# Patient Record
Sex: MALE | Race: WHITE | NOT HISPANIC OR LATINO | Employment: FULL TIME | URBAN - METROPOLITAN AREA
[De-identification: names, ages, dates, MRNs, and addresses within clinical notes are randomized per-mention and may not be internally consistent; named-entity substitution may affect disease eponyms.]

---

## 2019-09-24 ENCOUNTER — HISTORICAL (OUTPATIENT)
Dept: ADMINISTRATIVE | Facility: HOSPITAL | Age: 44
End: 2019-09-24

## 2019-09-24 LAB
INFLUENZA A ANTIGEN, POC: NEGATIVE
INFLUENZA B ANTIGEN, POC: NEGATIVE

## 2021-08-23 ENCOUNTER — HISTORICAL (OUTPATIENT)
Dept: RADIOLOGY | Facility: HOSPITAL | Age: 46
End: 2021-08-23

## 2021-09-30 ENCOUNTER — HISTORICAL (OUTPATIENT)
Dept: ADMINISTRATIVE | Facility: HOSPITAL | Age: 46
End: 2021-09-30

## 2021-09-30 LAB — SARS-COV-2 AG RESP QL IA.RAPID: NEGATIVE

## 2021-11-08 ENCOUNTER — HISTORICAL (OUTPATIENT)
Dept: ADMINISTRATIVE | Facility: HOSPITAL | Age: 46
End: 2021-11-08

## 2021-11-08 LAB — SARS-COV-2 RNA RESP QL NAA+PROBE: NEGATIVE

## 2021-12-22 ENCOUNTER — HISTORICAL (OUTPATIENT)
Dept: ADMINISTRATIVE | Facility: HOSPITAL | Age: 46
End: 2021-12-22

## 2022-04-10 ENCOUNTER — HISTORICAL (OUTPATIENT)
Dept: ADMINISTRATIVE | Facility: HOSPITAL | Age: 47
End: 2022-04-10

## 2022-04-27 VITALS
SYSTOLIC BLOOD PRESSURE: 138 MMHG | WEIGHT: 315 LBS | BODY MASS INDEX: 37.19 KG/M2 | DIASTOLIC BLOOD PRESSURE: 84 MMHG | OXYGEN SATURATION: 97 % | HEIGHT: 77 IN

## 2022-05-03 NOTE — HISTORICAL [facility]
This is a historical note converted from Yohannes. Formatting and pictures may have been removed.  Please reference Yohannes for original formatting and attached multimedia. Chief Complaint  aches, stuffy nose, cough 2-3 days, yesterday upper abdominal/back pain on left side, + exposure to flu, took dayquil/ emergen-c  History of Present Illness  45 yo M with hx of DM, CAD presents for about?5 days of aches, chills, fatigue, cough and congestion that worsened yesterday with aches.? R lower rib pain started last night. No fever, wife with similar symptoms last week + for flu. Trying DayQuil and Emergen-C.? Had pneumonia and flu a few years prior.  Review of Systems  Constitutional_negative for fever, + chills  ENMT_+rhinorrhea, + sinus pressure, + sore throat, no blurry vision or change in vision  Respiratory_+ cough  Gastrointestinal_negative for nausea or vomiting  Integumentary_negative for rash  Physical Exam  Vitals & Measurements  T:?36.7? ?C (Oral)? HR:?86(Peripheral)? RR:?19? BP:?168/105? SpO2:?100%?  HT:?195?cm? WT:?152.8?kg? BMI:?40.18?  Gen: WD NAD  CV: S1S2 RRR no MRG  Resp: reduced breath sounds BLL  HEENT: clear rhinorrhea, no cervical JOSÉ ANTONIO, +PND, TMs without bulging or erythema bilaterally, no posterior pharyngeal erythema?  MS: minimal TTP of L anterior and posterior lower ribs  Psych: Cooperative, approp mood and affect  Assessment/Plan  1.?Cough?R05  ?X-ray of the chest done today, per my review negative for pneumonia.  Will also call with final report  Flu test negative  Ordered:  Office/Outpatient Visit Level 4 Established [id number] PC, Cough  Nasopharyngitis, [facility], 09/24/19 8:53:00 CDT  XR Chest 2 Views, Routine, 09/24/19 8:25:00 CDT, Congestion, cough, pressure/sharp LLL area, None, Wheelchair, Patient Has IV?, Rad Type, Cough, Not Scheduled, 09/24/19 8:25:00 CDT  ?  2.?Nasopharyngitis?J00  Force fluids, Flonase and saline nasal spray twice a day.? Motrin for discomfort. If sinus pressure does not  improve on day 5 or later, can call us for consideration of antibiotic like Augmentin.  Cough can linger a few weeks but should not have new onset fever or shortness of breath.??ER for chest pain.?  Ordered:  Office/Outpatient Visit Level 4 Established [id number] PC, Cough  Nasopharyngitis, [facility], 09/24/19 8:53:00 CDT  ?   Problem List/Past Medical History  Ongoing  CAD - Coronary artery disease  Diabetes mellitus  HTN - Hypertension  Hyperlipidemia  Historical  CAP (community acquired pneumonia)  Diabetes  Hypertension  SIRS (systemic inflammatory response syndrome)  Procedure/Surgical History  Appendectomy  Tonsillectomy   Medications  Coreg 12.5 mg oral tablet, 12.5 mg= 1 tab(s), Oral, BIDWMeal  GLIMEPIRIDE 4MG TABLETS, 4 mg= 1 tab(s), Oral, BID  ibuprofen 800 mg oral tablet, 800 mg= 1 tab(s), Oral, q8hr  LOSARTAN/HCTZ 100/25MG TABLETS, 1 tab(s), Oral, Daily  METFORMIN 1000MG TABLETS, 1000 mg= 1 tab(s), Oral, BID  pioglitazone 30 mg oral tablet, 30 mg= 1 tab(s), Oral, Daily  Trulicity Pen 1.5 mg/0.5 mL subcutaneous solution, 1.5 mg, Subcutaneous  Allergies  No Known Allergies  No Known Medication Allergies  Social History  Abuse/Neglect  No, 09/24/2019  Alcohol - Denies Alcohol Use, 03/15/2014  Past, Beer, 09/20/2016  Employment/School  Employed, 09/20/2016  Home/Environment  Lives with Spouse., 09/20/2016  Substance Use - Denies Substance Abuse, 03/15/2014  Never, 09/20/2016  Tobacco - Denies Tobacco Use, 03/15/2014  Never (less than 100 in lifetime), N/A, 09/24/2019  Family History  COPD (chronic obstructive pulmonary disease).: Mother.  Cancer: Father.  Health Maintenance  Health Maintenance  ???Pending?(in the next year)  ??? ??OverDue  ??? ? ? ?Coronary Artery Disease Maintenance-Lipid Lowering Therapy due??and every?  ??? ? ? ?Depression Screening due??09/20/17??and every 1??year(s)  ??? ? ? ?Aspirin Therapy for CVD Prevention due??12/21/18??and every 1??year(s)  ??? ? ? ?Coronary Artery Disease  Maintenance-BMP due??12/21/18??and every 1??year(s)  ??? ? ? ?Diabetes Maintenance-Serum Creatinine due??12/21/18??and every 1??year(s)  ??? ? ? ?Coronary Artery Disease Maintenance-Electrocardiogram due??12/21/18??and every 1??year(s)  ??? ? ? ?Alcohol Misuse Screening due??01/01/19??and every 1??year(s)  ??? ??Due?  ??? ? ? ?ADL Screening due??09/24/19??and every 1??year(s)  ??? ? ? ?Coronary Artery Disease Maintenance-Antiplatelet Agent Prescribed due??09/24/19??and every?  ??? ? ? ?Diabetes Maintenance-Eye Exam due??09/24/19??and every?  ??? ? ? ?Diabetes Maintenance-Foot Exam due??09/24/19??and every?  ??? ? ? ?Diabetes Maintenance-Urine Dipstick due??09/24/19??Variable frequency  ??? ? ? ?Hypertension Management-Education due??09/24/19??and every 1??year(s)  ??? ? ? ?Influenza Vaccine due??09/24/19??and every?  ??? ? ? ?Tetanus Vaccine due??09/24/19??and every 10??year(s)  ??? ??Due In Future?  ??? ? ? ?Obesity Screening not due until??01/01/20??and every 1??year(s)  ??? ? ? ?Diabetes Maintenance-HgbA1c not due until??01/03/20??and every 1??year(s)  ??? ? ? ?Diabetes Maintenance-Fasting Lipid Profile not due until??01/03/20??and every 1??year(s)  ??? ? ? ?Hypertension Management-BMP not due until??01/03/20??and every 1??year(s)  ??? ? ? ?Blood Pressure Screening not due until??09/23/20??and every 1??year(s)  ??? ? ? ?Body Mass Index Check not due until??09/23/20??and every 1??year(s)  ??? ? ? ?Hypertension Management-Blood Pressure not due until??09/23/20??and every 1??year(s)  ???Satisfied?(in the past 1 year)  ??? ??Satisfied?  ??? ? ? ?Blood Pressure Screening on??09/24/19.??Satisfied by Joaquin Lion LPN  ??? ? ? ?Body Mass Index Check on??09/24/19.??Satisfied by Joaquin Lion LPN  ??? ? ? ?Hypertension Management-Blood Pressure on??09/24/19.??Satisfied by Joaquin Lion LPN  ??? ? ? ?Influenza Vaccine on??09/24/19.??Satisfied by Joaquin Lion LPN  ??? ? ? ?Obesity Screening  on??09/24/19.??Satisfied by Joaquin Lion LPN  ?

## 2022-05-03 NOTE — HISTORICAL [facility]
This is a historical note converted from Yohannes. Formatting and pictures may have been removed.  Please reference Yohannes for original formatting and attached multimedia. Chief Complaint  left shoulder pain no inury or surgery..states it started Wednesday after work in jaw and by the end of the night it radiated down to elbow area..x ray today..shoulder started hurting again yesterday  History of Present Illness  He is a pleasant?46-year-old who presents with left shoulder pain since December 2021.?Pain started a couple weeks ago without injury. It originated in his jaw and occasionally radiates to his elbow. He was seen in the ER last week with concerns of chest pain but MI was ruled out. Pain is worse anteriorly and superiorly with some radiation to the elbow. He reports shooting pains near his AC joint. It is worse with activity and somewhat better at rest. Pain was relieved with morphine, muscle relaxers, and heating pad. He denies numbness and tingling.  Review of Systems  Comprehensive review of system was performed with no exceptions other than noted in the history of present illness  Physical Exam  Vitals & Measurements  T:?36.2? ?C (Oral)? HR:?94(Apical)? BP:?138/84?  HT:?195.00?cm? WT:?145.000?kg? BMI:?38.13?  Gen: WN, WD, NAD  Card/Res: NL breathing, +distal pulses  Abdomen: ND  Shoulder Exam:???????? ???Right??????????Left  Skin:??????????????????????????? ???Normal???????Normal  AC joint tenderness:?????? ??None??????????++  Forward Flexion:???????????? ?180??????? ????180  Abduction:?????????????????????? 180??????????? 180  External Rotation:?????????????80??????????????80  Internal Rotation???????????? ? 80???????????? 80  Supraspinatus stress test:??Neg??????? ???Neg  Winters Impingement:?????Neg?????????? Neg  Neer Impingement:???????????Neg?????????? Neg  Apprehension:????????????????? Neg????? ?????Neg  OBriens:????????????? ???????????Neg?????????? Neg  Speeds test:??????????????????? Neg??????  ????Neg  Strength:  External Rotation:?????????????5/5?????????????5/5  Lift Off/belly press:????????????5/5????????????5/5  ?   N-V status:??????????????????????Intact??????????Intact  ?   C-spine: Normal ROM, NT  Assessment/Plan  1.?AC (acromioclavicular) joint arthritis?M19.019  ?Xrays today show AC joint arthritis. We will try an injection today. Mobic was sent to his pharmacy.?I recommended?Voltaren gel PRN around the AC?joint. ?He is following up with his cardiologist this afternoon for further evaluation. Follow up as needed.  ?  Risks of cortisone were discussed with the patient including hypopigmentation subcutaneous fat atrophy, and post injection pain flare. The patient understood these risks and requested to proceed. The?left shoulder?was prepped with betadine. The 1cc of steroid and 3cc of lidocaine injection was administered under sterile technique. The injection was administered in clinic by Cande Avitia, and the patient tolerated the procedure well.?  Orders:  meloxicam, 15 mg = 1 tab(s), Oral, Daily, # 30 tab(s), 0 Refill(s), Pharmacy: [facility], 195, cm, Height/Length Dosing, 12/22/21 8:12:00 CST, 145, kg, Weight Dosing, 12/22/21 8:12:00 CST  Referrals  Clinic Follow-up PRN, 12/22/21 8:38:00 CST, Future Order, [facility]   Problem List/Past Medical History  Ongoing  CAD - Coronary artery disease  Diabetes mellitus  HTN - Hypertension  Hyperlipidemia  Historical  2019-nCoV  CAP (community acquired pneumonia)  Diabetes  Hypertension  SIRS (systemic inflammatory response syndrome)  Procedure/Surgical History  Appendectomy  Tonsillectomy   Medications  atorvastatin 40 mg oral tablet, 40 mg= 1 tab(s), Oral, Daily  [place], 6 mg, Intra-Articular, Once  Coreg 12.5 mg oral tablet, 12.5 mg= 1 tab(s), Oral, BIDWMeal  lidocaine 2% injectable solution, 2 mL, Intra-Articular, Once  LOSARTAN/HCTZ 100/25MG TABLETS, 1 tab(s), Oral, Daily  metFORMIN 500 mg oral tablet, extended release, 1000 mg= 2  tab(s), Oral, BID  Mobic 15 mg oral tablet, 15 mg= 1 tab(s), Oral, Daily  NexIUM 40 mg oral delayed release capsule, 40 mg= 1 cap(s), Oral, Daily, 5 refills  Tresiba FlexTouch 100 units/mL subcutaneous solution, Subcutaneous, Daily  Trulicity Pen 3 mg/0.5 mL subcutaneous solution, 3 mg, Subcutaneous, qWeek  Ultram 50 mg oral tablet, 50 mg= 1 tab(s), Oral, q6hr, PRN  Allergies  No Known Allergies  No Known Medication Allergies  Social History  Abuse/Neglect  No, 12/22/2021  Alcohol - Denies Alcohol Use, 03/15/2014  Current, Beer, 1-2 times per month, 09/27/2021  Employment/School  Employed, 09/20/2016  Home/Environment  Lives with Spouse., 09/20/2016  Substance Use - Denies Substance Abuse, 03/15/2014  Never, 09/27/2021  Tobacco - Denies Tobacco Use, 03/15/2014  Never (less than 100 in lifetime), N/A, 12/22/2021  Family History  COPD (chronic obstructive pulmonary disease).: Mother.  Cancer: Father.  Health Maintenance  Health Maintenance  ???Pending?(in the next year)  ??? ??OverDue  ??? ? ? ?Aspirin Therapy for CVD Prevention due??12/21/18??and every 1??year(s)  ??? ??Due?  ??? ? ? ?Diabetes Maintenance-Eye Exam due??12/22/21??Unknown Frequency  ??? ? ? ?Diabetes Maintenance-Fasting Lipid Profile due??12/22/21??Variable frequency  ??? ? ? ?Diabetes Maintenance-Foot Exam due??12/22/21??Unknown Frequency  ??? ? ? ?Hypertension Management-Education due??12/22/21??and every 1??year(s)  ??? ? ? ?Tetanus Vaccine due??12/22/21??and every 10??year(s)  ??? ??Due In Future?  ??? ? ? ?Obesity Screening not due until??01/01/22??and every 1??year(s)  ??? ? ? ?Alcohol Misuse Screening not due until??01/02/22??and every 1??year(s)  ??? ? ? ?Diabetes Maintenance-HgbA1c not due until??07/01/22??and every 1??year(s)  ??? ? ? ?ADL Screening not due until??07/26/22??and every 1??year(s)  ??? ? ? ?Depression Screening not due until??09/27/22??and every 1??year(s)  ??? ? ? ?Hypertension Management-BMP not due until??12/16/22??and every  1??year(s)  ??? ? ? ?Coronary Artery Disease Maintenance-BMP not due until??12/16/22??and every 1??year(s)  ??? ? ? ?Diabetes Maintenance-Serum Creatinine not due until??12/16/22??and every 1??year(s)  ??? ? ? ?Coronary Artery Disease Maintenance-Electrocardiogram not due until??12/16/22??and every 1??year(s)  ???Satisfied?(in the past 1 year)  ??? ??Satisfied?  ??? ? ? ?ADL Screening on??07/26/21.??Satisfied by Nia Saldivar  ??? ? ? ?Alcohol Misuse Screening on??12/22/21.??Satisfied by Esha Thakkar LPN  ??? ? ? ?Blood Pressure Screening on??12/22/21.??Satisfied by Esha Thakkar LPN  ??? ? ? ?Body Mass Index Check on??12/22/21.??Satisfied by Esha Thakkar LPN  ??? ? ? ?Coronary Artery Disease Maintenance-Electrocardiogram on??12/16/21.  ??? ? ? ?Depression Screening on??09/27/21.??Satisfied by Jasmin Gomez LPN  ??? ? ? ?Diabetes Maintenance-Serum Creatinine on??12/16/21.??Satisfied by Luis Payton  ??? ? ? ?Diabetes Screening on??12/16/21.??Satisfied by Evelyn Morrow MT  ??? ? ? ?Hypertension Management-Blood Pressure on??12/22/21.??Satisfied by Esha Thakkar LPN  ??? ? ? ?Influenza Vaccine on??12/22/21.??Satisfied by Esha Thakkar LPN  ??? ? ? ?Obesity Screening on??12/22/21.??Satisfied by Esha Thakkar LPN  ??? ??Refused?  ??? ? ? ?Influenza Vaccine on??07/26/21.??Recorded by Tanika Brown??Reason: Patient Refuses  ?

## 2022-09-14 ENCOUNTER — OFFICE VISIT (OUTPATIENT)
Dept: URGENT CARE | Facility: CLINIC | Age: 47
End: 2022-09-14
Payer: COMMERCIAL

## 2022-09-14 VITALS
SYSTOLIC BLOOD PRESSURE: 140 MMHG | HEIGHT: 77 IN | HEART RATE: 99 BPM | DIASTOLIC BLOOD PRESSURE: 102 MMHG | TEMPERATURE: 99 F | BODY MASS INDEX: 37.19 KG/M2 | WEIGHT: 315 LBS | OXYGEN SATURATION: 97 % | RESPIRATION RATE: 20 BRPM

## 2022-09-14 DIAGNOSIS — J00 NASOPHARYNGITIS: ICD-10-CM

## 2022-09-14 DIAGNOSIS — J02.0 STREP PHARYNGITIS: Primary | ICD-10-CM

## 2022-09-14 DIAGNOSIS — R50.9 FEVER, UNSPECIFIED FEVER CAUSE: ICD-10-CM

## 2022-09-14 DIAGNOSIS — J02.9 SORE THROAT: ICD-10-CM

## 2022-09-14 LAB
CTP QC/QA: YES
CTP QC/QA: YES
S PYO RRNA THROAT QL PROBE: POSITIVE
SARS-COV-2 RDRP RESP QL NAA+PROBE: NEGATIVE

## 2022-09-14 PROCEDURE — 99213 PR OFFICE/OUTPT VISIT, EST, LEVL III, 20-29 MIN: ICD-10-PCS | Mod: 25,,, | Performed by: FAMILY MEDICINE

## 2022-09-14 PROCEDURE — U0002 COVID-19 LAB TEST NON-CDC: HCPCS | Mod: QW,,, | Performed by: FAMILY MEDICINE

## 2022-09-14 PROCEDURE — 3008F PR BODY MASS INDEX (BMI) DOCUMENTED: ICD-10-PCS | Mod: CPTII,,, | Performed by: FAMILY MEDICINE

## 2022-09-14 PROCEDURE — U0002: ICD-10-PCS | Mod: QW,,, | Performed by: FAMILY MEDICINE

## 2022-09-14 PROCEDURE — 1160F RVW MEDS BY RX/DR IN RCRD: CPT | Mod: CPTII,,, | Performed by: FAMILY MEDICINE

## 2022-09-14 PROCEDURE — 87880 STREP A ASSAY W/OPTIC: CPT | Mod: QW,,, | Performed by: FAMILY MEDICINE

## 2022-09-14 PROCEDURE — 3080F DIAST BP >= 90 MM HG: CPT | Mod: CPTII,,, | Performed by: FAMILY MEDICINE

## 2022-09-14 PROCEDURE — 3077F SYST BP >= 140 MM HG: CPT | Mod: CPTII,,, | Performed by: FAMILY MEDICINE

## 2022-09-14 PROCEDURE — 3080F PR MOST RECENT DIASTOLIC BLOOD PRESSURE >= 90 MM HG: ICD-10-PCS | Mod: CPTII,,, | Performed by: FAMILY MEDICINE

## 2022-09-14 PROCEDURE — 1160F PR REVIEW ALL MEDS BY PRESCRIBER/CLIN PHARMACIST DOCUMENTED: ICD-10-PCS | Mod: CPTII,,, | Performed by: FAMILY MEDICINE

## 2022-09-14 PROCEDURE — 87880 POCT RAPID STREP A: ICD-10-PCS | Mod: QW,,, | Performed by: FAMILY MEDICINE

## 2022-09-14 PROCEDURE — 99213 OFFICE O/P EST LOW 20 MIN: CPT | Mod: 25,,, | Performed by: FAMILY MEDICINE

## 2022-09-14 PROCEDURE — 1159F PR MEDICATION LIST DOCUMENTED IN MEDICAL RECORD: ICD-10-PCS | Mod: CPTII,,, | Performed by: FAMILY MEDICINE

## 2022-09-14 PROCEDURE — 1159F MED LIST DOCD IN RCRD: CPT | Mod: CPTII,,, | Performed by: FAMILY MEDICINE

## 2022-09-14 PROCEDURE — 3077F PR MOST RECENT SYSTOLIC BLOOD PRESSURE >= 140 MM HG: ICD-10-PCS | Mod: CPTII,,, | Performed by: FAMILY MEDICINE

## 2022-09-14 PROCEDURE — 3008F BODY MASS INDEX DOCD: CPT | Mod: CPTII,,, | Performed by: FAMILY MEDICINE

## 2022-09-14 RX ORDER — CARVEDILOL 12.5 MG/1
12.5 TABLET ORAL 2 TIMES DAILY
COMMUNITY
Start: 2022-08-22

## 2022-09-14 RX ORDER — METHOCARBAMOL 750 MG/1
750 TABLET, FILM COATED ORAL 4 TIMES DAILY PRN
COMMUNITY
Start: 2022-08-22

## 2022-09-14 RX ORDER — DULAGLUTIDE 3 MG/.5ML
INJECTION, SOLUTION SUBCUTANEOUS
COMMUNITY
Start: 2022-09-02

## 2022-09-14 RX ORDER — ESOMEPRAZOLE MAGNESIUM 40 MG/1
40 CAPSULE, DELAYED RELEASE ORAL
COMMUNITY
Start: 2021-09-27 | End: 2023-02-28

## 2022-09-14 RX ORDER — AMOXICILLIN 500 MG/1
500 CAPSULE ORAL EVERY 12 HOURS
Qty: 20 CAPSULE | Refills: 0 | Status: SHIPPED | OUTPATIENT
Start: 2022-09-14 | End: 2022-09-24

## 2022-09-14 RX ORDER — PROMETHAZINE HYDROCHLORIDE AND DEXTROMETHORPHAN HYDROBROMIDE 6.25; 15 MG/5ML; MG/5ML
5 SYRUP ORAL NIGHTLY PRN
Qty: 50 ML | Refills: 0 | Status: SHIPPED | OUTPATIENT
Start: 2022-09-14 | End: 2022-09-24

## 2022-09-14 RX ORDER — LOSARTAN POTASSIUM AND HYDROCHLOROTHIAZIDE 25; 100 MG/1; MG/1
1 TABLET ORAL DAILY
COMMUNITY
Start: 2022-08-22

## 2022-09-14 RX ORDER — INSULIN DEGLUDEC 100 U/ML
INJECTION, SOLUTION SUBCUTANEOUS
COMMUNITY
Start: 2022-09-02

## 2022-09-14 RX ORDER — METFORMIN HYDROCHLORIDE 500 MG/1
1000 TABLET, EXTENDED RELEASE ORAL DAILY
COMMUNITY
Start: 2022-08-23

## 2022-09-14 RX ORDER — ATORVASTATIN CALCIUM 40 MG/1
TABLET, FILM COATED ORAL
COMMUNITY
Start: 2022-08-23

## 2022-09-14 NOTE — PATIENT INSTRUCTIONS
Flonase and saline nasal spray twice a day, antihistamine at bedtime.  Force fluids.   Cough may linger a few weeks but should not have fever, chest pain, or shortness of breath.     Amoxicillin as directed, warm liquids and motrin for discomfort.  Contagious until three doses taken.

## 2022-09-14 NOTE — PROGRESS NOTES
"Subjective:       Patient ID: Lucio Harris is a 47 y.o. male.    Vitals:  height is 6' 5" (1.956 m) and weight is 142.9 kg (315 lb) (abnormal). His oral temperature is 98.7 °F (37.1 °C). His blood pressure is 140/102 (abnormal) and his pulse is 99. His respiration is 20 and oxygen saturation is 97%.     Chief Complaint: Sinus Problem (Sinus congestion, body aches, headache, fever, sore throat, x 2 days)    Sinus congestion, body aches, headache, subjective fever, sore throat, x 2 days.  No chest pain. No SOB.  No known exposures.       Sinus Problem  Associated symptoms include congestion, coughing, sinus pressure and a sore throat. Pertinent negatives include no shortness of breath.   Constitution: Positive for fever.   HENT:  Positive for congestion, postnasal drip, sinus pressure and sore throat.    Cardiovascular:  Negative for chest pain and palpitations.   Respiratory:  Positive for cough. Negative for chest tightness and shortness of breath.    Gastrointestinal:  Negative for nausea and vomiting.     Objective:      Physical Exam   Constitutional: He is oriented to person, place, and time. He appears well-developed. No distress.   HENT:   Head: Normocephalic.   Ears:   Right Ear: Tympanic membrane and external ear normal.   Left Ear: Tympanic membrane and external ear normal.   Nose: Rhinorrhea present.   Mouth/Throat: Uvula is midline and mucous membranes are normal. No uvula swelling. Cobblestoning present. No oropharyngeal exudate or posterior oropharyngeal edema. Tonsils are 0 on the right. Tonsils are 0 on the left. No tonsillar exudate.   Eyes: Pupils are equal, round, and reactive to light. Right eye exhibits no discharge. Left eye exhibits no discharge.   Neck: Neck supple. No tracheal deviation present.   Cardiovascular: Normal rate, regular rhythm and normal heart sounds.   No murmur heard.  Pulmonary/Chest: Effort normal and breath sounds normal. No stridor. No respiratory distress. He has no " wheezes.   Lymphadenopathy:     He has no cervical adenopathy.   Neurological: no focal deficit. He is alert and oriented to person, place, and time.   Skin: Skin is warm and dry.   Psychiatric: Mood and thought content normal.   Nursing note and vitals reviewed.      Assessment:       1. Strep pharyngitis    2. Fever, unspecified fever cause    3. Sore throat    4. Nasopharyngitis          Plan:         Strep pharyngitis  -     amoxicillin (AMOXIL) 500 MG capsule; Take 1 capsule (500 mg total) by mouth every 12 (twelve) hours. for 10 days  Dispense: 20 capsule; Refill: 0    Fever, unspecified fever cause  -     POCT COVID-19 Rapid Screening  -     POCT rapid strep A    Sore throat  -     POCT COVID-19 Rapid Screening  -     POCT rapid strep A    Nasopharyngitis  -     promethazine-dextromethorphan (PROMETHAZINE-DM) 6.25-15 mg/5 mL Syrp; Take 5 mLs by mouth nightly as needed (for cough).  Dispense: 50 mL; Refill: 0          COVID test negative.   Strep test positive.

## 2023-10-05 DIAGNOSIS — R11.2 NAUSEA WITH VOMITING, UNSPECIFIED: ICD-10-CM

## 2023-10-05 DIAGNOSIS — R10.10 UPPER ABDOMINAL PAIN, UNSPECIFIED: ICD-10-CM

## 2023-10-06 RX ORDER — ESOMEPRAZOLE MAGNESIUM 40 MG/1
CAPSULE, DELAYED RELEASE ORAL
Qty: 30 CAPSULE | Refills: 5 | Status: SHIPPED | OUTPATIENT
Start: 2023-10-06

## 2024-01-04 ENCOUNTER — OFFICE VISIT (OUTPATIENT)
Dept: URGENT CARE | Facility: CLINIC | Age: 49
End: 2024-01-04
Payer: COMMERCIAL

## 2024-01-04 VITALS
RESPIRATION RATE: 20 BRPM | TEMPERATURE: 99 F | HEART RATE: 98 BPM | SYSTOLIC BLOOD PRESSURE: 154 MMHG | WEIGHT: 285 LBS | DIASTOLIC BLOOD PRESSURE: 100 MMHG | HEIGHT: 77 IN | BODY MASS INDEX: 33.65 KG/M2 | OXYGEN SATURATION: 96 %

## 2024-01-04 DIAGNOSIS — J10.1 INFLUENZA B: Primary | ICD-10-CM

## 2024-01-04 DIAGNOSIS — R09.81 SINUS CONGESTION: ICD-10-CM

## 2024-01-04 LAB
CTP QC/QA: YES
POC MOLECULAR INFLUENZA A AGN: NEGATIVE
POC MOLECULAR INFLUENZA B AGN: POSITIVE

## 2024-01-04 PROCEDURE — 99203 OFFICE O/P NEW LOW 30 MIN: CPT | Mod: 25,,,

## 2024-01-04 PROCEDURE — 87502 INFLUENZA DNA AMP PROBE: CPT | Mod: QW,,,

## 2024-01-04 PROCEDURE — 96372 THER/PROPH/DIAG INJ SC/IM: CPT | Mod: ,,,

## 2024-01-04 RX ORDER — OSELTAMIVIR PHOSPHATE 75 MG/1
75 CAPSULE ORAL 2 TIMES DAILY
Qty: 10 CAPSULE | Refills: 0 | Status: SHIPPED | OUTPATIENT
Start: 2024-01-04 | End: 2024-01-09

## 2024-01-04 RX ORDER — DEXAMETHASONE SODIUM PHOSPHATE 100 MG/10ML
10 INJECTION INTRAMUSCULAR; INTRAVENOUS ONCE
Status: COMPLETED | OUTPATIENT
Start: 2024-01-04 | End: 2024-01-04

## 2024-01-04 RX ORDER — AMLODIPINE BESYLATE 5 MG/1
5 TABLET ORAL
COMMUNITY
Start: 2023-12-06

## 2024-01-04 RX ORDER — EMPAGLIFLOZIN 10 MG/1
10 TABLET, FILM COATED ORAL
COMMUNITY
Start: 2023-12-06

## 2024-01-04 RX ADMIN — DEXAMETHASONE SODIUM PHOSPHATE 10 MG: 100 INJECTION INTRAMUSCULAR; INTRAVENOUS at 10:01

## 2024-01-04 NOTE — PROGRESS NOTES
"Subjective:      Patient ID: Lucio Harris is a 48 y.o. male.    Vitals:  height is 6' 5" (1.956 m) and weight is 129.3 kg (285 lb). His oral temperature is 98.6 °F (37 °C). His blood pressure is 154/100 (abnormal) and his pulse is 98. His respiration is 20 and oxygen saturation is 96%.     Chief Complaint: Sinus Problem (Body aches, chills, sinus congestion, headache, fatigue x 2 days. Pt exposed to flu.)    Patient is a 48-year-old male that presents complaining of exposure to flu by wife with symptoms of body aches, chills, congestion, headache, fatigue for the past 2 days. Patient denies any SOB, CP, rash, n/v/d, or neck stiffness.          Constitution: Positive for chills and fatigue.   HENT:  Positive for congestion.    Neurological:  Positive for headaches.      Objective:     Physical Exam   Constitutional: He is oriented to person, place, and time. He appears well-developed. He is cooperative.  Non-toxic appearance. He does not appear ill. No distress.   HENT:   Head: Normocephalic and atraumatic.   Ears:   Right Ear: Hearing, tympanic membrane, external ear and ear canal normal.   Left Ear: Hearing, tympanic membrane, external ear and ear canal normal.   Nose: Congestion present.   Mouth/Throat: Uvula is midline and mucous membranes are normal. Mucous membranes are moist. No trismus in the jaw. Normal dentition. No uvula swelling. No oropharyngeal exudate, posterior oropharyngeal edema or posterior oropharyngeal erythema. Oropharynx is clear.   Eyes: Conjunctivae and lids are normal. Right conjunctiva is not injected. Left conjunctiva is not injected.   Neck: Neck supple. No edema present. No erythema present. No neck rigidity present.   Cardiovascular: Normal rate, regular rhythm, normal heart sounds and normal pulses.   Pulmonary/Chest: Effort normal and breath sounds normal. No respiratory distress. He has no decreased breath sounds. He has no rhonchi.   Abdominal: Normal appearance and bowel sounds " are normal. There is no abdominal tenderness.   Neurological: He is alert and oriented to person, place, and time. He exhibits normal muscle tone. Coordination normal.   Skin: Skin is warm, intact, not diaphoretic and no rash.   Psychiatric: His speech is normal and behavior is normal. Mood, judgment and thought content normal.   Nursing note and vitals reviewed.      Assessment:     1. Influenza B    2. Sinus congestion           Previous History      Review of patient's allergies indicates:  No Known Allergies    Past Medical History:   Diagnosis Date    Diabetes mellitus, type 2     GERD (gastroesophageal reflux disease)     Hyperlipidemia     Hypertension      Current Outpatient Medications   Medication Instructions    amLODIPine (NORVASC) 5 mg, Oral    atorvastatin (LIPITOR) 40 MG tablet SMARTSI Tablet(s) By Mouth Every Evening    carvediloL (COREG) 12.5 mg, Oral, 2 times daily    esomeprazole (NEXIUM) 40 MG capsule TAKE ONE Capsule BY MOUTH ONCE DAILY    JARDIANCE 10 mg, Oral    losartan-hydrochlorothiazide 100-25 mg (HYZAAR) 100-25 mg per tablet 1 tablet, Oral, Daily    metFORMIN (GLUCOPHAGE-XR) 1,000 mg, Oral, Daily    methocarbamoL (ROBAXIN) 750 mg, Oral, 4 times daily PRN    oseltamivir (TAMIFLU) 75 mg, Oral, 2 times daily    TRESIBA FLEXTOUCH U-100 100 unit/mL (3 mL) insulin pen Subcutaneous    TRULICITY 3 mg/0.5 mL pen injector SMARTSI.5 Milliliter(s) SUB-Q Once a Week     Past Surgical History:   Procedure Laterality Date    skin cancer removal Bilateral     arms     Family History   Problem Relation Age of Onset    Emphysema Mother     Cancer Father     Diabetes Sister        Social History     Tobacco Use    Smoking status: Never     Passive exposure: Never    Smokeless tobacco: Never   Substance Use Topics    Alcohol use: Never    Drug use: Never        Physical Exam      Vital Signs Reviewed   BP (!) 154/100 (BP Location: Right arm)   Pulse 98   Temp 98.6 °F (37 °C) (Oral)   Resp 20   Ht  "6' 5" (1.956 m)   Wt 129.3 kg (285 lb)   SpO2 96%   BMI 33.80 kg/m²        Procedures    Procedures     Labs     Results for orders placed or performed in visit on 01/04/24   POCT Influenza A/B MOLECULAR   Result Value Ref Range    POC Molecular Influenza A Ag Negative Negative, Not Reported    POC Molecular Influenza B Ag Positive (A) Negative, Not Reported     Acceptable Yes       Plan:       Influenza B  -     oseltamivir (TAMIFLU) 75 MG capsule; Take 1 capsule (75 mg total) by mouth 2 (two) times daily. for 5 days  Dispense: 10 capsule; Refill: 0    Sinus congestion  -     POCT Influenza A/B MOLECULAR      Drink plenty of fluids.     Tylenol every 4 hours, Motrin every 6 as needed for fever.    Flu medication to help decrease symptoms/duration    Take OTC Decongestants  (Claritin D/sudafed OR Coricidin for people with HTN) to cut down on the fluid in the lining of your nose and relieve swollen nasal passages and congestion. May also use cough/cold/congestion medication such as Dayquil/Nyquil and/or antihistamine such as Claritin/Zyrtec/Allegra.  Cepacol sore throat lozenges/spray if needed.     Patient is contagious for 5 days from symptom onset.     Go to ER with any shortness of breath or other worrisome symptoms.                "

## 2024-05-20 DIAGNOSIS — R11.2 NAUSEA WITH VOMITING, UNSPECIFIED: ICD-10-CM

## 2024-05-20 DIAGNOSIS — R10.10 UPPER ABDOMINAL PAIN, UNSPECIFIED: ICD-10-CM

## 2024-05-24 RX ORDER — ESOMEPRAZOLE MAGNESIUM 40 MG/1
CAPSULE, DELAYED RELEASE ORAL
Qty: 30 CAPSULE | Refills: 5 | Status: SHIPPED | OUTPATIENT
Start: 2024-05-24

## 2024-07-10 ENCOUNTER — OFFICE VISIT (OUTPATIENT)
Dept: URGENT CARE | Facility: CLINIC | Age: 49
End: 2024-07-10
Payer: COMMERCIAL

## 2024-07-10 VITALS
HEIGHT: 77 IN | OXYGEN SATURATION: 96 % | RESPIRATION RATE: 20 BRPM | DIASTOLIC BLOOD PRESSURE: 90 MMHG | BODY MASS INDEX: 32.35 KG/M2 | HEART RATE: 114 BPM | TEMPERATURE: 99 F | SYSTOLIC BLOOD PRESSURE: 135 MMHG | WEIGHT: 274 LBS

## 2024-07-10 DIAGNOSIS — L03.116 CELLULITIS OF LEFT LOWER LEG: Primary | ICD-10-CM

## 2024-07-10 PROCEDURE — 99213 OFFICE O/P EST LOW 20 MIN: CPT | Mod: ,,, | Performed by: PHYSICIAN ASSISTANT

## 2024-07-10 RX ORDER — CHOLECALCIFEROL (VITAMIN D3) 125 MCG
5000 CAPSULE ORAL
COMMUNITY
Start: 2024-05-20

## 2024-07-10 RX ORDER — CEPHALEXIN 500 MG/1
500 CAPSULE ORAL EVERY 6 HOURS
Qty: 40 CAPSULE | Refills: 0 | Status: SHIPPED | OUTPATIENT
Start: 2024-07-10 | End: 2024-07-20

## 2024-07-10 RX ORDER — INSULIN GLARGINE 300 U/ML
INJECTION, SOLUTION SUBCUTANEOUS
COMMUNITY
Start: 2024-06-28

## 2024-07-10 NOTE — PROGRESS NOTES
"Subjective:      Patient ID: Lucio Harris is a 49 y.o. male.    Vitals:  height is 6' 5" (1.956 m) and weight is 124.3 kg (274 lb). His oral temperature is 98.7 °F (37.1 °C). His blood pressure is 135/90 (abnormal) and his pulse is 114 (abnormal). His respiration is 20 and oxygen saturation is 96%.     Chief Complaint: Recurrent Skin Infections     Patient is a 49 y.o. male who presents to urgent care with complaints of left lower leg redness, swelling, right lower leg soreness x3 days.  States that this redness is isolated to the medial aspect of the lower leg.  He denies involvement of the proximal posterior calf or distally into the foot.  Denies any fever chills body aches shortness of breath.        Skin:  Positive for erythema.      Objective:     Physical Exam   Constitutional: He is oriented to person, place, and time. He appears well-developed.   HENT:   Head: Normocephalic and atraumatic. Head is without abrasion, without contusion and without laceration.   Ears:   Right Ear: External ear normal.   Left Ear: External ear normal.   Nose: Nose normal.   Eyes: Conjunctivae, EOM and lids are normal.   Neck: Phonation normal. Neck supple.   Cardiovascular: Regular rhythm. Tachycardia present.   Pulmonary/Chest: Effort normal. No respiratory distress.   Musculoskeletal: Normal range of motion.         General: Normal range of motion.   Neurological: He is alert and oriented to person, place, and time.   Skin: Skin is warm, dry and intact. Capillary refill takes less than 2 seconds. erythema and lesion No abrasion, No burn, No bruising and No ecchymosis   Psychiatric: His speech is normal and behavior is normal. Judgment and thought content normal.   Nursing note and vitals reviewed.  Pulse was noted to be elevated during triage.  When I listen to his heart was irregular rhythm with a rate of 102 beats per minute.  Left lower leg:    There is a 10 x 10 cm area of erythema and mild swelling noted to the medial " "aspect of the lower leg.  There was no proximal erythematous streaking.  No posterior calf swelling erythema or tenderness.  There was also no erythema swelling or tenderness in the foot.  Dorsalis pedis pulse 2 +.         Previous History      Review of patient's allergies indicates:  No Known Allergies    Past Medical History:   Diagnosis Date    Diabetes mellitus, type 2     GERD (gastroesophageal reflux disease)     Hyperlipidemia     Hypertension      Current Outpatient Medications   Medication Instructions    amLODIPine (NORVASC) 5 mg, Oral    atorvastatin (LIPITOR) 40 MG tablet SMARTSI Tablet(s) By Mouth Every Evening    carvediloL (COREG) 12.5 mg, 2 times daily    cephALEXin (KEFLEX) 500 mg, Oral, Every 6 hours    cholecalciferol (vitamin D3) 5,000 Units, Oral    esomeprazole (NEXIUM) 40 MG capsule TAKE ONE Capsule BY MOUTH ONCE DAILY    JARDIANCE 10 mg, Oral    losartan-hydrochlorothiazide 100-25 mg (HYZAAR) 100-25 mg per tablet 1 tablet, Oral, Daily    metFORMIN (GLUCOPHAGE-XR) 1,000 mg, Oral, Daily    methocarbamoL (ROBAXIN) 750 mg, 4 times daily PRN    TOUJEO SOLOSTAR U-300 INSULIN 300 unit/mL (1.5 mL) InPn pen Subcutaneous    TRESIBA FLEXTOUCH U-100 100 unit/mL (3 mL) insulin pen Inject into the skin.    TRULICITY 3 mg/0.5 mL pen injector SMARTSI.5 Milliliter(s) SUB-Q Once a Week     Past Surgical History:   Procedure Laterality Date    skin cancer removal Bilateral     arms     Family History   Problem Relation Name Age of Onset    Emphysema Mother      Cancer Father      Diabetes Sister         Social History     Tobacco Use    Smoking status: Never     Passive exposure: Never    Smokeless tobacco: Never   Substance Use Topics    Alcohol use: Never    Drug use: Never        Physical Exam      Vital Signs Reviewed   BP (!) 135/90 (BP Location: Left arm)   Pulse (!) 114   Temp 98.7 °F (37.1 °C) (Oral)   Resp 20   Ht 6' 5" (1.956 m)   Wt 124.3 kg (274 lb)   SpO2 96%   BMI 32.49 kg/m²        " Procedures    Procedures     Labs     Results for orders placed or performed in visit on 01/04/24   POCT Influenza A/B MOLECULAR   Result Value Ref Range    POC Molecular Influenza A Ag Negative Negative, Not Reported    POC Molecular Influenza B Ag Positive (A) Negative, Not Reported     Acceptable Yes      Assessment:     1. Cellulitis of left lower leg        Plan:       Cellulitis of left lower leg    Other orders  -     cephALEXin (KEFLEX) 500 MG capsule; Take 1 capsule (500 mg total) by mouth every 6 (six) hours. for 10 days  Dispense: 40 capsule; Refill: 0    Wound Care: Twice daily wound care as discussed.   Pain: Take OTC Tylenol or Ibuprofen per package instructions as needed for pain.    Follow up with your Primary Care Provider within 3-5 days for a recheck.   Present to the Emergency Department for any significant change or worsening symptoms including worsening redness, swelling, purulent discharge, fever, body aches, or chills.

## 2024-07-10 NOTE — PATIENT INSTRUCTIONS
Wound Care: Twice daily wound care as discussed.   Pain: Take OTC Tylenol or Ibuprofen per package instructions as needed for pain.    Follow up with your Primary Care Provider within 3-5 days for a recheck.   Present to the Emergency Department for any significant change or worsening symptoms including worsening redness, swelling, purulent discharge, fever, body aches, or chills.

## 2025-05-05 ENCOUNTER — LAB VISIT (OUTPATIENT)
Dept: LAB | Facility: HOSPITAL | Age: 50
End: 2025-05-05
Attending: INTERNAL MEDICINE
Payer: COMMERCIAL

## 2025-05-05 DIAGNOSIS — N18.9 ANEMIA OF CHRONIC RENAL FAILURE, UNSPECIFIED CKD STAGE: Primary | ICD-10-CM

## 2025-05-05 DIAGNOSIS — I43 DILATED CARDIOMYOPATHY SECONDARY TO ELECTROLYTE DEFICIENCY: ICD-10-CM

## 2025-05-05 DIAGNOSIS — E03.9 PRIMARY HYPOTHYROIDISM: ICD-10-CM

## 2025-05-05 DIAGNOSIS — E55.9 AVITAMINOSIS D: ICD-10-CM

## 2025-05-05 DIAGNOSIS — N18.9 CHRONIC KIDNEY DISEASE, UNSPECIFIED: ICD-10-CM

## 2025-05-05 DIAGNOSIS — E21.3 HYPERPARATHYROIDISM, UNSPECIFIED: ICD-10-CM

## 2025-05-05 DIAGNOSIS — E87.8 DILATED CARDIOMYOPATHY SECONDARY TO ELECTROLYTE DEFICIENCY: ICD-10-CM

## 2025-05-05 DIAGNOSIS — D63.1 ANEMIA OF CHRONIC RENAL FAILURE, UNSPECIFIED CKD STAGE: Primary | ICD-10-CM

## 2025-05-05 LAB
25(OH)D3+25(OH)D2 SERPL-MCNC: 47 NG/ML (ref 30–80)
ALBUMIN SERPL-MCNC: 3.9 G/DL (ref 3.5–5)
ALBUMIN/GLOB SERPL: 1.1 RATIO (ref 1.1–2)
ALP SERPL-CCNC: 53 UNIT/L (ref 40–150)
ALT SERPL-CCNC: 24 UNIT/L (ref 0–55)
ANION GAP SERPL CALC-SCNC: 9 MEQ/L
AST SERPL-CCNC: 17 UNIT/L (ref 11–45)
BACTERIA #/AREA URNS AUTO: ABNORMAL /HPF
BILIRUB SERPL-MCNC: 0.4 MG/DL
BILIRUB UR QL STRIP.AUTO: NEGATIVE
BUN SERPL-MCNC: 21.7 MG/DL (ref 8.4–25.7)
CALCIUM SERPL-MCNC: 9.4 MG/DL (ref 8.4–10.2)
CHLORIDE SERPL-SCNC: 103 MMOL/L (ref 98–107)
CLARITY UR: CLEAR
CO2 SERPL-SCNC: 26 MMOL/L (ref 22–29)
COLOR UR AUTO: ABNORMAL
CREAT SERPL-MCNC: 1.29 MG/DL (ref 0.72–1.25)
CREAT UR-MCNC: 192.1 MG/DL (ref 63–166)
CREAT/UREA NIT SERPL: 17
ERYTHROCYTE [DISTWIDTH] IN BLOOD BY AUTOMATED COUNT: 13.3 % (ref 11.5–17)
GFR SERPLBLD CREATININE-BSD FMLA CKD-EPI: >60 ML/MIN/1.73/M2
GLOBULIN SER-MCNC: 3.5 GM/DL (ref 2.4–3.5)
GLUCOSE SERPL-MCNC: 162 MG/DL (ref 74–100)
GLUCOSE UR QL STRIP: ABNORMAL
HCT VFR BLD AUTO: 42.6 % (ref 42–52)
HGB BLD-MCNC: 14.4 G/DL (ref 14–18)
HGB UR QL STRIP: NEGATIVE
KETONES UR QL STRIP: NEGATIVE
LEUKOCYTE ESTERASE UR QL STRIP: NEGATIVE
MAGNESIUM SERPL-MCNC: 1.8 MG/DL (ref 1.6–2.6)
MCH RBC QN AUTO: 27.9 PG (ref 27–31)
MCHC RBC AUTO-ENTMCNC: 33.8 G/DL (ref 33–36)
MCV RBC AUTO: 82.6 FL (ref 80–94)
MUCOUS THREADS URNS QL MICRO: ABNORMAL /LPF
NITRITE UR QL STRIP: NEGATIVE
NRBC BLD AUTO-RTO: 0 %
PH UR STRIP: 5.5 [PH]
PHOSPHATE SERPL-MCNC: 3.1 MG/DL (ref 2.3–4.7)
PLATELET # BLD AUTO: 194 X10(3)/MCL (ref 130–400)
PMV BLD AUTO: 10.5 FL (ref 7.4–10.4)
POTASSIUM SERPL-SCNC: 4.3 MMOL/L (ref 3.5–5.1)
PROT SERPL-MCNC: 7.4 GM/DL (ref 6.4–8.3)
PROT UR QL STRIP: ABNORMAL
PROT UR STRIP-MCNC: 108.2 MG/DL
PTH-INTACT SERPL-MCNC: 50 PG/ML (ref 8.7–77)
RBC # BLD AUTO: 5.16 X10(6)/MCL (ref 4.7–6.1)
RBC #/AREA URNS AUTO: ABNORMAL /HPF
SODIUM SERPL-SCNC: 138 MMOL/L (ref 136–145)
SP GR UR STRIP.AUTO: 1.03 (ref 1–1.03)
SQUAMOUS #/AREA URNS LPF: ABNORMAL /HPF
TSH SERPL-ACNC: 1.7 UIU/ML (ref 0.35–4.94)
URINE PROTEIN/CREATININE RATIO (OLG): 0.6
UROBILINOGEN UR STRIP-ACNC: NORMAL
WBC # BLD AUTO: 6.52 X10(3)/MCL (ref 4.5–11.5)
WBC #/AREA URNS AUTO: ABNORMAL /HPF

## 2025-05-05 PROCEDURE — 85027 COMPLETE CBC AUTOMATED: CPT

## 2025-05-05 PROCEDURE — 36415 COLL VENOUS BLD VENIPUNCTURE: CPT

## 2025-05-05 PROCEDURE — 81001 URINALYSIS AUTO W/SCOPE: CPT

## 2025-05-05 PROCEDURE — 82306 VITAMIN D 25 HYDROXY: CPT

## 2025-05-05 PROCEDURE — 82570 ASSAY OF URINE CREATININE: CPT

## 2025-05-05 PROCEDURE — 80053 COMPREHEN METABOLIC PANEL: CPT

## 2025-05-05 PROCEDURE — 83735 ASSAY OF MAGNESIUM: CPT

## 2025-05-05 PROCEDURE — 84443 ASSAY THYROID STIM HORMONE: CPT

## 2025-05-05 PROCEDURE — 84100 ASSAY OF PHOSPHORUS: CPT

## 2025-05-05 PROCEDURE — 83970 ASSAY OF PARATHORMONE: CPT
